# Patient Record
Sex: MALE | Employment: OTHER | URBAN - NONMETROPOLITAN AREA
[De-identification: names, ages, dates, MRNs, and addresses within clinical notes are randomized per-mention and may not be internally consistent; named-entity substitution may affect disease eponyms.]

---

## 2024-09-17 ENCOUNTER — OFFICE VISIT (OUTPATIENT)
Dept: PULMONOLOGY | Age: 79
End: 2024-09-17
Payer: MEDICARE

## 2024-09-17 ENCOUNTER — TELEPHONE (OUTPATIENT)
Dept: PULMONOLOGY | Age: 79
End: 2024-09-17

## 2024-09-17 VITALS
WEIGHT: 169.4 LBS | TEMPERATURE: 98.4 F | SYSTOLIC BLOOD PRESSURE: 135 MMHG | HEART RATE: 83 BPM | OXYGEN SATURATION: 98 % | HEIGHT: 69 IN | DIASTOLIC BLOOD PRESSURE: 78 MMHG | BODY MASS INDEX: 25.09 KG/M2

## 2024-09-17 DIAGNOSIS — R06.02 SHORTNESS OF BREATH: Primary | ICD-10-CM

## 2024-09-17 DIAGNOSIS — R06.02 SHORTNESS OF BREATH: ICD-10-CM

## 2024-09-17 DIAGNOSIS — R91.8 LUNG NODULES: ICD-10-CM

## 2024-09-17 DIAGNOSIS — R06.2 WHEEZING: ICD-10-CM

## 2024-09-17 DIAGNOSIS — Z95.5 HISTORY OF HEART ARTERY STENT: ICD-10-CM

## 2024-09-17 DIAGNOSIS — Z78.9 NEVER SMOKED CIGARETTES: ICD-10-CM

## 2024-09-17 DIAGNOSIS — R91.8 GROUND GLASS OPACITY PRESENT ON IMAGING OF LUNG: ICD-10-CM

## 2024-09-17 DIAGNOSIS — I25.10 CORONARY ARTERY DISEASE INVOLVING NATIVE CORONARY ARTERY OF NATIVE HEART WITHOUT ANGINA PECTORIS: ICD-10-CM

## 2024-09-17 LAB
BASOPHILS # BLD: 0 K/UL (ref 0–0.2)
BASOPHILS NFR BLD: 0.2 % (ref 0–1)
EOSINOPHIL # BLD: 0.6 K/UL (ref 0–0.6)
EOSINOPHIL NFR BLD: 6.4 % (ref 0–5)
ERYTHROCYTE [DISTWIDTH] IN BLOOD BY AUTOMATED COUNT: 14.8 % (ref 11.5–14.5)
ERYTHROCYTE [SEDIMENTATION RATE] IN BLOOD BY WESTERGREN METHOD: 4 MM/HR (ref 0–15)
HCT VFR BLD AUTO: 49.6 % (ref 42–52)
HGB BLD-MCNC: 16.4 G/DL (ref 14–18)
IGG SERPL-MCNC: 938 MG/DL (ref 700–1600)
IGM SERPL-MCNC: 51 MG/DL (ref 40–230)
IMM GRANULOCYTES # BLD: 0.1 K/UL
LYMPHOCYTES # BLD: 0.9 K/UL (ref 1.1–4.5)
LYMPHOCYTES NFR BLD: 9.9 % (ref 20–40)
MCH RBC QN AUTO: 31.7 PG (ref 27–31)
MCHC RBC AUTO-ENTMCNC: 33.1 G/DL (ref 33–37)
MCV RBC AUTO: 95.9 FL (ref 80–94)
MONOCYTES # BLD: 1.1 K/UL (ref 0–0.9)
MONOCYTES NFR BLD: 12.4 % (ref 0–10)
NEUTROPHILS # BLD: 6.2 K/UL (ref 1.5–7.5)
NEUTS SEG NFR BLD: 70.3 % (ref 50–65)
PLATELET # BLD AUTO: 180 K/UL (ref 130–400)
PMV BLD AUTO: 9.2 FL (ref 9.4–12.4)
RBC # BLD AUTO: 5.17 M/UL (ref 4.7–6.1)
RHEUMATOID FACT SER NEPH-ACNC: 12 IU/ML
WBC # BLD AUTO: 8.9 K/UL (ref 4.8–10.8)

## 2024-09-17 PROCEDURE — 99204 OFFICE O/P NEW MOD 45 MIN: CPT | Performed by: INTERNAL MEDICINE

## 2024-09-17 PROCEDURE — 94618 PULMONARY STRESS TESTING: CPT | Performed by: INTERNAL MEDICINE

## 2024-09-17 PROCEDURE — 1123F ACP DISCUSS/DSCN MKR DOCD: CPT | Performed by: INTERNAL MEDICINE

## 2024-09-17 RX ORDER — VIT C/E/ZN/COPPR/LUTEIN/ZEAXAN 60 MG-6 MG
1 CAPSULE ORAL DAILY
COMMUNITY

## 2024-09-17 RX ORDER — TIMOLOL MALEATE 5 MG/ML
SOLUTION/ DROPS OPHTHALMIC
COMMUNITY
Start: 2024-02-23

## 2024-09-17 RX ORDER — BETAMETHASONE DIPROPIONATE 0.5 MG/G
CREAM TOPICAL 2 TIMES DAILY
COMMUNITY
Start: 2022-02-21

## 2024-09-17 RX ORDER — ALBUTEROL SULFATE 90 UG/1
INHALANT RESPIRATORY (INHALATION)
COMMUNITY
Start: 2024-09-09

## 2024-09-17 RX ORDER — TRIAMCINOLONE ACETONIDE 1 MG/G
CREAM TOPICAL 2 TIMES DAILY
COMMUNITY
Start: 2024-09-08 | End: 2024-09-19

## 2024-09-17 RX ORDER — PREDNISONE 20 MG/1
TABLET ORAL
COMMUNITY
Start: 2024-09-08

## 2024-09-17 RX ORDER — LATANOPROST 50 UG/ML
SOLUTION/ DROPS OPHTHALMIC
COMMUNITY
Start: 2020-10-21

## 2024-09-17 RX ORDER — ASPIRIN 81 MG/1
81 TABLET ORAL EVERY OTHER DAY
COMMUNITY

## 2024-09-17 RX ORDER — CARVEDILOL 3.12 MG/1
3.12 TABLET ORAL 2 TIMES DAILY WITH MEALS
COMMUNITY
Start: 2024-09-10 | End: 2024-10-11

## 2024-09-17 RX ORDER — ISOSORBIDE MONONITRATE 30 MG/1
TABLET, EXTENDED RELEASE ORAL
COMMUNITY
Start: 2022-06-20

## 2024-09-17 RX ORDER — CLOPIDOGREL BISULFATE 75 MG/1
TABLET ORAL
COMMUNITY
Start: 2024-08-23

## 2024-09-17 RX ORDER — ROSUVASTATIN CALCIUM 40 MG/1
TABLET, COATED ORAL
COMMUNITY
Start: 2024-08-17

## 2024-09-17 RX ORDER — FLUTICASONE PROPIONATE AND SALMETEROL 500; 50 UG/1; UG/1
POWDER RESPIRATORY (INHALATION)
COMMUNITY
Start: 2024-08-19

## 2024-09-17 RX ORDER — PREDNISONE 10 MG/1
TABLET ORAL
COMMUNITY
Start: 2024-07-12

## 2024-09-17 RX ORDER — TRIAZOLAM 0.25 MG
125 TABLET ORAL
COMMUNITY
Start: 2022-01-07

## 2024-09-17 RX ORDER — TICAGRELOR 90 MG/1
TABLET ORAL
COMMUNITY

## 2024-09-17 ASSESSMENT — ENCOUNTER SYMPTOMS
BACK PAIN: 0
APNEA: 0
COUGH: 0
RHINORRHEA: 0
CHEST TIGHTNESS: 0
WHEEZING: 0
ABDOMINAL DISTENTION: 0
ABDOMINAL PAIN: 0
ANAL BLEEDING: 0
SHORTNESS OF BREATH: 1

## 2024-09-18 ENCOUNTER — TELEPHONE (OUTPATIENT)
Dept: PULMONOLOGY | Age: 79
End: 2024-09-18

## 2024-09-18 LAB
DISTANCE WALKED: 100 FT
SPO2: 96 %

## 2024-09-19 LAB
IGE SERPL-ACNC: 777 KU/L
NUCLEAR IGG SER QL IA: NORMAL

## 2024-09-21 LAB
A1AT PHENOTYP SERPL-IMP: NORMAL
A1AT SERPL-MCNC: 151 MG/DL (ref 90–200)

## 2024-09-23 ENCOUNTER — TELEPHONE (OUTPATIENT)
Dept: PULMONOLOGY | Age: 79
End: 2024-09-23

## 2024-09-23 DIAGNOSIS — R06.02 SHORTNESS OF BREATH: Primary | ICD-10-CM

## 2024-09-23 DIAGNOSIS — R91.8 LUNG NODULES: ICD-10-CM

## 2024-09-23 DIAGNOSIS — R06.2 WHEEZING: ICD-10-CM

## 2024-09-24 RX ORDER — FLUTICASONE FUROATE, UMECLIDINIUM BROMIDE AND VILANTEROL TRIFENATATE 200; 62.5; 25 UG/1; UG/1; UG/1
1 POWDER RESPIRATORY (INHALATION) DAILY
Qty: 1 EACH | Refills: 1 | Status: SHIPPED | OUTPATIENT
Start: 2024-09-24

## 2024-10-02 DIAGNOSIS — R06.2 WHEEZING: ICD-10-CM

## 2024-10-02 DIAGNOSIS — R06.02 SHORTNESS OF BREATH: Primary | ICD-10-CM

## 2024-10-03 RX ORDER — ALBUTEROL SULFATE 90 UG/1
INHALANT RESPIRATORY (INHALATION)
Qty: 6.7 EACH | Refills: 11 | Status: SHIPPED | OUTPATIENT
Start: 2024-10-03

## 2024-11-01 ENCOUNTER — TELEPHONE (OUTPATIENT)
Dept: PULMONOLOGY | Age: 79
End: 2024-11-01

## 2024-11-04 ENCOUNTER — HOSPITAL ENCOUNTER (OUTPATIENT)
Dept: PULMONOLOGY | Age: 79
Discharge: HOME | End: 2024-11-04
Payer: MEDICARE

## 2024-11-04 ENCOUNTER — OFFICE VISIT (OUTPATIENT)
Dept: PULMONOLOGY | Age: 79
End: 2024-11-04
Payer: MEDICARE

## 2024-11-04 ENCOUNTER — HOSPITAL ENCOUNTER (OUTPATIENT)
Dept: CT IMAGING | Age: 79
Discharge: HOME | End: 2024-11-04
Payer: MEDICARE

## 2024-11-04 VITALS
TEMPERATURE: 96.8 F | OXYGEN SATURATION: 96 % | WEIGHT: 168 LBS | HEART RATE: 61 BPM | BODY MASS INDEX: 24.88 KG/M2 | DIASTOLIC BLOOD PRESSURE: 77 MMHG | HEIGHT: 69 IN | RESPIRATION RATE: 20 BRPM | SYSTOLIC BLOOD PRESSURE: 133 MMHG

## 2024-11-04 VITALS
BODY MASS INDEX: 23.7 KG/M2 | RESPIRATION RATE: 18 BRPM | HEART RATE: 62 BPM | OXYGEN SATURATION: 98 % | HEIGHT: 69 IN | WEIGHT: 160 LBS

## 2024-11-04 DIAGNOSIS — R06.02 SHORTNESS OF BREATH: ICD-10-CM

## 2024-11-04 DIAGNOSIS — R49.0 HOARSENESS: ICD-10-CM

## 2024-11-04 DIAGNOSIS — Z95.5 HISTORY OF HEART ARTERY STENT: ICD-10-CM

## 2024-11-04 DIAGNOSIS — I25.10 CORONARY ARTERY DISEASE INVOLVING NATIVE CORONARY ARTERY OF NATIVE HEART WITHOUT ANGINA PECTORIS: ICD-10-CM

## 2024-11-04 DIAGNOSIS — J98.4 RESTRICTIVE LUNG DISEASE: ICD-10-CM

## 2024-11-04 DIAGNOSIS — R06.2 WHEEZING: ICD-10-CM

## 2024-11-04 DIAGNOSIS — R06.02 SHORTNESS OF BREATH: Primary | ICD-10-CM

## 2024-11-04 PROCEDURE — 94726 PLETHYSMOGRAPHY LUNG VOLUMES: CPT

## 2024-11-04 PROCEDURE — 94010 BREATHING CAPACITY TEST: CPT

## 2024-11-04 PROCEDURE — 1123F ACP DISCUSS/DSCN MKR DOCD: CPT | Performed by: INTERNAL MEDICINE

## 2024-11-04 PROCEDURE — 99214 OFFICE O/P EST MOD 30 MIN: CPT | Performed by: INTERNAL MEDICINE

## 2024-11-04 PROCEDURE — 94729 DIFFUSING CAPACITY: CPT

## 2024-11-04 PROCEDURE — 71250 CT THORAX DX C-: CPT

## 2024-11-04 PROCEDURE — 1159F MED LIST DOCD IN RCRD: CPT | Performed by: INTERNAL MEDICINE

## 2024-11-04 RX ORDER — ALBUTEROL SULFATE 0.83 MG/ML
2.5 SOLUTION RESPIRATORY (INHALATION) 2 TIMES DAILY PRN
Status: DISCONTINUED | OUTPATIENT
Start: 2024-11-04 | End: 2024-11-06 | Stop reason: HOSPADM

## 2024-11-04 RX ORDER — PRASUGREL 10 MG/1
10 TABLET, FILM COATED ORAL DAILY
COMMUNITY
Start: 2024-09-26

## 2024-11-04 ASSESSMENT — ENCOUNTER SYMPTOMS
COUGH: 0
ABDOMINAL DISTENTION: 0
ANAL BLEEDING: 0
APNEA: 0
WHEEZING: 0
SHORTNESS OF BREATH: 0
BACK PAIN: 0
ABDOMINAL PAIN: 0
RHINORRHEA: 0
CHEST TIGHTNESS: 0

## 2024-11-04 NOTE — PROCEDURES
Media Information          Pulmonary Function Study    Interpretation:    The FVC is Normal. FEV1 is Normal. FEV1/FVC ratio is Normal. Total lung capacity is moderately reduced. Residual volume is severely reduced.      Diffusing lung capacity when corrected for alveolar volume is moderately reduced.         Impression:    Moderate restrictive lung disease.         Vikas Macias MD, FCCP, [facility]

## 2024-11-04 NOTE — PROGRESS NOTES
Pulmonary and Sleep Medicine    Antwan Lerma (:  1945) is a 79 y.o. male,Established patient, here for evaluation of the following chief complaint(s):  Follow-up (6 month follow up- SOB/CT Completed on 2024/PFT completed 2024//Pt states that he is concerned that he is losing his voice. He feels like he has a constant ache in his throat like he is choking )      Referring physician:  No referring provider defined for this encounter.     ASSESSMENT/PLAN:  1. Shortness of breath  2. Hoarseness  -     Ambulatory referral to ENT  3. Wheezing  4. Coronary artery disease involving native coronary artery of native heart without angina pectoris  5. History of heart artery stent  6. Restrictive lung disease        Shortness of breath of unclear etiology.  He is concerned about his voice becoming weak and hoarse.  We will refer to ENT for further evaluation.  He does have slight eosinophilia however he does not have symptoms of wheezing.  No evidence of autoimmune disorder.       Vikas Macias MD, [facility], [facility]    No follow-ups on file.    SUBJECTIVE/OBJECTIVE:        Patient is here for follow-up on shortness of breath.  His eosinophil count is slightly elevated at 6%.  His IgE is elevated.  He denies wheezing.  He has been on Trelegy inhaler.  He does not feel it has been helping.  He says his shortness of breath symptoms are getting very slowly better.  He complains of a very weak voice.  He says his voice is almost nonexistent by the end of the day.  He had a CT of the chest done today, to my interpretation showed no significant pulmonary fibrosis.  His pulmonary function study showed moderate restriction with mildly reduced diffusing capacity.          Continue the following medications as reported by the patient:    Prior to Visit Medications    Medication Sig Taking? Authorizing Provider   prasugrel (EFFIENT) 10 MG TABS Take 1 tablet by mouth daily Yes Provider, MD Jose   albuterol

## 2024-11-05 NOTE — ADDENDUM NOTE
Encounter addended by: Vikas Macias MD on: 11/4/2024 8:16 PM   Actions taken: Clinical Note Signed, Charge Capture section accepted

## 2024-12-17 ENCOUNTER — OFFICE VISIT (OUTPATIENT)
Dept: PULMONOLOGY | Age: 79
End: 2024-12-17
Payer: MEDICARE

## 2024-12-17 VITALS
HEART RATE: 65 BPM | OXYGEN SATURATION: 95 % | TEMPERATURE: 97.2 F | HEIGHT: 69 IN | BODY MASS INDEX: 26.07 KG/M2 | DIASTOLIC BLOOD PRESSURE: 74 MMHG | SYSTOLIC BLOOD PRESSURE: 147 MMHG | RESPIRATION RATE: 20 BRPM | WEIGHT: 176 LBS

## 2024-12-17 DIAGNOSIS — Z95.5 HISTORY OF HEART ARTERY STENT: ICD-10-CM

## 2024-12-17 DIAGNOSIS — I25.10 CORONARY ARTERY DISEASE INVOLVING NATIVE CORONARY ARTERY OF NATIVE HEART WITHOUT ANGINA PECTORIS: ICD-10-CM

## 2024-12-17 DIAGNOSIS — R06.02 SHORTNESS OF BREATH: Primary | ICD-10-CM

## 2024-12-17 DIAGNOSIS — R06.2 WHEEZING: ICD-10-CM

## 2024-12-17 DIAGNOSIS — R49.0 HOARSENESS: ICD-10-CM

## 2024-12-17 PROCEDURE — 1123F ACP DISCUSS/DSCN MKR DOCD: CPT | Performed by: INTERNAL MEDICINE

## 2024-12-17 PROCEDURE — 99213 OFFICE O/P EST LOW 20 MIN: CPT | Performed by: INTERNAL MEDICINE

## 2024-12-17 PROCEDURE — 1159F MED LIST DOCD IN RCRD: CPT | Performed by: INTERNAL MEDICINE

## 2024-12-17 ASSESSMENT — ENCOUNTER SYMPTOMS
COUGH: 0
RHINORRHEA: 0
ABDOMINAL DISTENTION: 0
APNEA: 0
BACK PAIN: 0
ABDOMINAL PAIN: 0
SHORTNESS OF BREATH: 0
CHEST TIGHTNESS: 0
WHEEZING: 0
ANAL BLEEDING: 0

## 2024-12-17 NOTE — PROGRESS NOTES
Pulmonary and Sleep Medicine    Antwan Lerma (:  1945) is a 79 y.o. male,Established patient, here for evaluation of the following chief complaint(s):  Follow-up (6 week follow up for SOB/Pt has brought in some medical records. He is concerned about the reports in them. )      Referring physician:  Laverne Albarran Pa  [address]  [address]  [place] [address]     ASSESSMENT/PLAN:  1. Shortness of breath  2. Hoarseness  3. Wheezing  4. Coronary artery disease involving native coronary artery of native heart without angina pectoris  5. History of heart artery stent        Shortness of breath has subsided.  Most likely was secondary to coronary insufficiency that improved with stenting.  Continue antiplatelets.  Continue follow-up with cardiology.  We will see the patient as needed.       Vikas Macias MD, [facility], [facility]    Return if symptoms worsen or fail to improve.    SUBJECTIVE/OBJECTIVE:        Patient is here for follow-up on shortness of breath.  At this time he denies any shortness of breath with exertion or at rest.  He did have a coronary artery stent placed and he has been off Effient.  Since then his shortness of breath has subsided.  He does not have resting or exertional dyspnea.  He has not been using the Trelegy.  Since then his voice has improved.  He does not have a feeling of swelling in his throat.  CT of the chest showed no evidence of pulmonary fibrosis.  His pulmonary function study showed moderate restriction however he did have multiple rib fractures in the past which most likely explained the findings on the pulmonary function study.          Continue the following medications as reported by the patient:    Prior to Visit Medications    Medication Sig Taking? Authorizing Provider   prasugrel (EFFIENT) 10 MG TABS Take 1 tablet by mouth daily Yes Provider, MD Jose   albuterol sulfate HFA (PROVENTIL;VENTOLIN;PROAIR) 108 (90 Base) MCG/ACT inhaler INHALE 2 PUFFS